# Patient Record
Sex: FEMALE | Race: AMERICAN INDIAN OR ALASKA NATIVE | URBAN - METROPOLITAN AREA
[De-identification: names, ages, dates, MRNs, and addresses within clinical notes are randomized per-mention and may not be internally consistent; named-entity substitution may affect disease eponyms.]

---

## 2022-07-06 ENCOUNTER — WEB ENCOUNTER (OUTPATIENT)
Dept: URBAN - METROPOLITAN AREA CLINIC 115 | Facility: CLINIC | Age: 77
End: 2022-07-06

## 2022-07-06 ENCOUNTER — LAB OUTSIDE AN ENCOUNTER (OUTPATIENT)
Dept: URBAN - METROPOLITAN AREA CLINIC 115 | Facility: CLINIC | Age: 77
End: 2022-07-06

## 2022-07-06 ENCOUNTER — OFFICE VISIT (OUTPATIENT)
Dept: URBAN - METROPOLITAN AREA CLINIC 115 | Facility: CLINIC | Age: 77
End: 2022-07-06
Payer: COMMERCIAL

## 2022-07-06 VITALS
TEMPERATURE: 97.8 F | HEIGHT: 62 IN | HEART RATE: 81 BPM | WEIGHT: 146 LBS | SYSTOLIC BLOOD PRESSURE: 126 MMHG | DIASTOLIC BLOOD PRESSURE: 75 MMHG | BODY MASS INDEX: 26.87 KG/M2

## 2022-07-06 DIAGNOSIS — K44.9 DIAPHRAGMATIC HERNIA WITHOUT OBSTRUCTION OR GANGRENE: ICD-10-CM

## 2022-07-06 DIAGNOSIS — K21.9 GASTROESOPHAGEAL REFLUX DISEASE, UNSPECIFIED WHETHER ESOPHAGITIS PRESENT: ICD-10-CM

## 2022-07-06 PROBLEM — 235595009: Status: ACTIVE | Noted: 2022-07-06

## 2022-07-06 PROCEDURE — 99203 OFFICE O/P NEW LOW 30 MIN: CPT | Performed by: INTERNAL MEDICINE

## 2022-07-06 RX ORDER — OMEPRAZOLE 20 MG/1
1 CAPSULE 30 MINUTES BEFORE MORNING MEAL CAPSULE, DELAYED RELEASE ORAL ONCE A DAY
Status: ACTIVE | COMMUNITY

## 2022-07-06 RX ORDER — LOSARTAN POTASSIUM 50 MG/1
1 TABLET TABLET ORAL ONCE A DAY
Status: ACTIVE | COMMUNITY

## 2022-07-06 NOTE — HPI-TODAY'S VISIT:
Nirav Garzon was seen today for evaluation of hernia.  Patient was having chronic cough and bronchitis and had an x-ray done that showed hiatal hernia and hence he was advised to see GI.  She also reports having intermittent dysphagia.  Denies obvious heartburn but reports having regurgitation.  She has had a history of colonoscopy 18 months ago with Dr. Rios's group.  Patient does not recall having any vomiting or nausea.  She currently on omeprazole 20 mg was prescribed by pulmonology.  Denies any chronic pain medications.

## 2022-07-07 ENCOUNTER — OFFICE VISIT (OUTPATIENT)
Dept: URBAN - METROPOLITAN AREA SURGERY CENTER 13 | Facility: SURGERY CENTER | Age: 77
End: 2022-07-07
Payer: COMMERCIAL

## 2022-07-07 ENCOUNTER — TELEPHONE ENCOUNTER (OUTPATIENT)
Dept: URBAN - METROPOLITAN AREA CLINIC 92 | Facility: CLINIC | Age: 77
End: 2022-07-07

## 2022-07-07 ENCOUNTER — CLAIMS CREATED FROM THE CLAIM WINDOW (OUTPATIENT)
Dept: URBAN - METROPOLITAN AREA CLINIC 4 | Facility: CLINIC | Age: 77
End: 2022-07-07
Payer: COMMERCIAL

## 2022-07-07 DIAGNOSIS — K21.9 GASTRO-ESOPHAGEAL REFLUX DISEASE WITHOUT ESOPHAGITIS: ICD-10-CM

## 2022-07-07 DIAGNOSIS — K31.89 ACQUIRED DEFORMITY OF DUODENUM: ICD-10-CM

## 2022-07-07 DIAGNOSIS — K29.70 GASTRITIS, UNSPECIFIED, WITHOUT BLEEDING: ICD-10-CM

## 2022-07-07 DIAGNOSIS — K21.9 ACID REFLUX: ICD-10-CM

## 2022-07-07 DIAGNOSIS — R93.3 ABN FINDINGS-GI TRACT: ICD-10-CM

## 2022-07-07 DIAGNOSIS — K22.2 ACQUIRED ESOPHAGEAL RING: ICD-10-CM

## 2022-07-07 PROCEDURE — 88312 SPECIAL STAINS GROUP 1: CPT | Performed by: PATHOLOGY

## 2022-07-07 PROCEDURE — 43239 EGD BIOPSY SINGLE/MULTIPLE: CPT | Performed by: INTERNAL MEDICINE

## 2022-07-07 PROCEDURE — 43249 ESOPH EGD DILATION <30 MM: CPT | Performed by: INTERNAL MEDICINE

## 2022-07-07 PROCEDURE — 88305 TISSUE EXAM BY PATHOLOGIST: CPT | Performed by: PATHOLOGY

## 2022-07-07 PROCEDURE — G8907 PT DOC NO EVENTS ON DISCHARG: HCPCS | Performed by: INTERNAL MEDICINE

## 2022-07-07 RX ORDER — OMEPRAZOLE 20 MG/1
1 CAPSULE 30 MINUTES BEFORE MORNING MEAL CAPSULE, DELAYED RELEASE ORAL ONCE A DAY
Status: ACTIVE | COMMUNITY

## 2022-07-07 RX ORDER — LOSARTAN POTASSIUM 50 MG/1
1 TABLET TABLET ORAL ONCE A DAY
Status: ACTIVE | COMMUNITY

## 2022-07-07 RX ORDER — OMEPRAZOLE 40 MG/1
1 CAPSULE 30 MINUTES BEFORE MORNING MEAL CAPSULE, DELAYED RELEASE ORAL ONCE A DAY
Qty: 90 CAPSULE | Refills: 1 | OUTPATIENT
Start: 2022-07-07

## 2022-07-19 ENCOUNTER — TELEPHONE ENCOUNTER (OUTPATIENT)
Dept: URBAN - METROPOLITAN AREA CLINIC 92 | Facility: CLINIC | Age: 77
End: 2022-07-19

## 2022-07-27 ENCOUNTER — OFFICE VISIT (OUTPATIENT)
Dept: URBAN - METROPOLITAN AREA CLINIC 115 | Facility: CLINIC | Age: 77
End: 2022-07-27
Payer: COMMERCIAL

## 2022-07-27 VITALS
SYSTOLIC BLOOD PRESSURE: 138 MMHG | HEIGHT: 62 IN | HEART RATE: 98 BPM | DIASTOLIC BLOOD PRESSURE: 83 MMHG | BODY MASS INDEX: 27.6 KG/M2 | TEMPERATURE: 97.8 F | WEIGHT: 150 LBS

## 2022-07-27 DIAGNOSIS — K29.60 REFLUX GASTRITIS: ICD-10-CM

## 2022-07-27 DIAGNOSIS — K22.89 PRESBYESOPHAGUS: ICD-10-CM

## 2022-07-27 DIAGNOSIS — K44.9 PARAESOPHAGEAL HIATAL HERNIA: ICD-10-CM

## 2022-07-27 PROCEDURE — 99214 OFFICE O/P EST MOD 30 MIN: CPT | Performed by: INTERNAL MEDICINE

## 2022-07-27 RX ORDER — FAMOTIDINE 40 MG/1
1 TABLET AT BEDTIME TABLET, FILM COATED ORAL ONCE A DAY
Qty: 90 TABLET | Refills: 4 | OUTPATIENT
Start: 2022-07-27

## 2022-07-27 RX ORDER — OMEPRAZOLE 40 MG/1
1 CAPSULE 30 MINUTES BEFORE MORNING MEAL CAPSULE, DELAYED RELEASE ORAL ONCE A DAY
Qty: 90 CAPSULE | Refills: 1 | Status: ACTIVE | COMMUNITY
Start: 2022-07-07

## 2022-07-27 RX ORDER — OMEPRAZOLE 40 MG/1
1 CAPSULE 30 MINUTES BEFORE MORNING MEAL CAPSULE, DELAYED RELEASE ORAL ONCE A DAY
Qty: 90 CAPSULE | Refills: 4
Start: 2022-07-07

## 2022-07-27 RX ORDER — LOSARTAN POTASSIUM 50 MG/1
1 TABLET TABLET ORAL ONCE A DAY
Status: ACTIVE | COMMUNITY

## 2022-07-27 RX ORDER — OMEPRAZOLE 20 MG/1
1 CAPSULE 30 MINUTES BEFORE MORNING MEAL CAPSULE, DELAYED RELEASE ORAL ONCE A DAY
Status: ACTIVE | COMMUNITY

## 2022-07-27 NOTE — HPI-TODAY'S VISIT:
Nirav Garzon was seen today for evaluation of hernia.  Patient was having chronic cough and bronchitis and had an x-ray done that showed hiatal hernia and hence he was advised to see GI.  She also reports having intermittent dysphagia.  Denies obvious heartburn but reports having regurgitation.  She has had a history of colonoscopy 18 months ago with Dr. Rios's group.  Patient does not recall having any vomiting or nausea.  She currently on omeprazole 20 mg was prescribed by pulmonology.  Denies any chronic pain medications. 7/27/22 : Ms. Garzon was seen today for the follow-up complaints of having abdominal bloating off and on but otherwise upper GI symptoms have remarkably improved.  Upper endoscopy revealed a paraesophageal hernia and presbyesophagus which was confirmed again on the barium swallow.  Patient reports having occasional reflux pantoprazole once a day in the morning is helping her symptoms.  She also had an EGD with dilatation and denies any dysphagia at this time.

## 2023-01-06 ENCOUNTER — OFFICE VISIT (OUTPATIENT)
Dept: URBAN - METROPOLITAN AREA CLINIC 115 | Facility: CLINIC | Age: 78
End: 2023-01-06
Payer: COMMERCIAL

## 2023-01-06 VITALS
SYSTOLIC BLOOD PRESSURE: 136 MMHG | HEIGHT: 62 IN | HEART RATE: 89 BPM | WEIGHT: 147 LBS | TEMPERATURE: 98.2 F | BODY MASS INDEX: 27.05 KG/M2 | DIASTOLIC BLOOD PRESSURE: 84 MMHG

## 2023-01-06 DIAGNOSIS — K29.60 REFLUX GASTRITIS: ICD-10-CM

## 2023-01-06 DIAGNOSIS — K22.89 PRESBYESOPHAGUS: ICD-10-CM

## 2023-01-06 DIAGNOSIS — K21.9 GASTRO-ESOPHAGEAL REFLUX DISEASE WITHOUT ESOPHAGITIS: ICD-10-CM

## 2023-01-06 DIAGNOSIS — K22.2 SCHATZKI'S RING: ICD-10-CM

## 2023-01-06 PROBLEM — 266435005: Status: ACTIVE | Noted: 2022-07-06

## 2023-01-06 PROBLEM — 72950008: Status: ACTIVE | Noted: 2022-07-27

## 2023-01-06 PROBLEM — 235634004: Status: ACTIVE | Noted: 2022-07-27

## 2023-01-06 PROBLEM — 371132002: Status: ACTIVE | Noted: 2022-07-06

## 2023-01-06 PROBLEM — 235623002: Status: ACTIVE | Noted: 2023-01-06

## 2023-01-06 PROBLEM — 21101000119105: Status: ACTIVE | Noted: 2023-01-06

## 2023-01-06 PROCEDURE — 99214 OFFICE O/P EST MOD 30 MIN: CPT | Performed by: INTERNAL MEDICINE

## 2023-01-06 RX ORDER — FAMOTIDINE 40 MG/1
1 TABLET AT BEDTIME TABLET, FILM COATED ORAL ONCE A DAY
Qty: 90 TABLET | Refills: 4 | Status: ACTIVE | COMMUNITY
Start: 2022-07-27

## 2023-01-06 RX ORDER — OMEPRAZOLE 40 MG/1
1 CAPSULE 30 MINUTES BEFORE MORNING MEAL CAPSULE, DELAYED RELEASE ORAL ONCE A DAY
Qty: 90 CAPSULE | Refills: 4
Start: 2022-07-07

## 2023-01-06 RX ORDER — OMEPRAZOLE 20 MG/1
1 CAPSULE 30 MINUTES BEFORE MORNING MEAL CAPSULE, DELAYED RELEASE ORAL ONCE A DAY
Status: ACTIVE | COMMUNITY

## 2023-01-06 RX ORDER — OMEPRAZOLE 40 MG/1
1 CAPSULE 30 MINUTES BEFORE MORNING MEAL CAPSULE, DELAYED RELEASE ORAL ONCE A DAY
Qty: 90 CAPSULE | Refills: 4 | Status: ACTIVE | COMMUNITY
Start: 2022-07-07

## 2023-01-06 RX ORDER — FAMOTIDINE 40 MG/1
1 TABLET AT BEDTIME TABLET, FILM COATED ORAL ONCE A DAY
Qty: 90 TABLET | Refills: 4
Start: 2022-07-27

## 2023-01-06 RX ORDER — LOSARTAN POTASSIUM 50 MG/1
1 TABLET TABLET ORAL ONCE A DAY
Status: ACTIVE | COMMUNITY

## 2023-01-06 NOTE — HPI-TODAY'S VISIT:
Nirav Garzon was seen today for evaluation of hernia.  Patient was having chronic cough and bronchitis and had an x-ray done that showed hiatal hernia and hence he was advised to see GI.  She also reports having intermittent dysphagia.  Denies obvious heartburn but reports having regurgitation.  She has had a history of colonoscopy 18 months ago with Dr. Riso's group.  Patient does not recall having any vomiting or nausea.  She currently on omeprazole 20 mg was prescribed by pulmonology.  Denies any chronic pain medications. 7/27/22 : Ms. Garzon was seen today for the follow-up complaints of having abdominal bloating off and on but otherwise upper GI symptoms have remarkably improved.  Upper endoscopy revealed a paraesophageal hernia and presbyesophagus which was confirmed again on the barium swallow.  Patient reports having occasional reflux pantoprazole once a day in the morning is helping her symptoms.  She also had an EGD with dilatation and denies any dysphagia at this time.  1/6/23: Ms. Gastelum was seen in the office for follow-up.  Since she was last seen reports acid reflux happens 3-4 times a week and she takes a second dose of omeprazole intermittently.  She takes Pepcid at bedtime.  Barium showed swallow showed a large hiatal hernia without any obstruction but she had a Schatzki's ring that was dilated since then her reflux symptoms have been stable and also her dysphagia has been also very infrequent.  Patient admits for having dysphagia more when she eats faster.  EGD findings have been discussed with the patient.

## 2024-01-26 ENCOUNTER — OFFICE VISIT (OUTPATIENT)
Dept: URBAN - METROPOLITAN AREA CLINIC 115 | Facility: CLINIC | Age: 79
End: 2024-01-26

## 2024-01-28 ENCOUNTER — ERX REFILL RESPONSE (OUTPATIENT)
Dept: URBAN - METROPOLITAN AREA CLINIC 82 | Facility: CLINIC | Age: 79
End: 2024-01-28

## 2024-01-28 RX ORDER — OMEPRAZOLE 40 MG/1
TAKE 1 CAPSULE BY MOUTH EVERY DAY 30 MINUTES BEFORE BREAKFAST CAPSULE, DELAYED RELEASE ORAL
Qty: 90 CAPSULE | Refills: 0 | OUTPATIENT

## 2024-01-28 RX ORDER — OMEPRAZOLE 40 MG/1
1 CAPSULE 30 MINUTES BEFORE MORNING MEAL CAPSULE, DELAYED RELEASE ORAL ONCE A DAY
Qty: 90 CAPSULE | Refills: 4 | OUTPATIENT

## 2024-02-16 ENCOUNTER — OV EP (OUTPATIENT)
Dept: URBAN - METROPOLITAN AREA CLINIC 115 | Facility: CLINIC | Age: 79
End: 2024-02-16
Payer: COMMERCIAL

## 2024-02-16 VITALS
DIASTOLIC BLOOD PRESSURE: 79 MMHG | SYSTOLIC BLOOD PRESSURE: 130 MMHG | HEIGHT: 62 IN | TEMPERATURE: 97.9 F | HEART RATE: 91 BPM | BODY MASS INDEX: 27.23 KG/M2 | WEIGHT: 148 LBS

## 2024-02-16 DIAGNOSIS — K21.9 GASTRO-ESOPHAGEAL REFLUX DISEASE WITHOUT ESOPHAGITIS: ICD-10-CM

## 2024-02-16 DIAGNOSIS — K22.89 PRESBYESOPHAGUS: ICD-10-CM

## 2024-02-16 DIAGNOSIS — K44.9 PARAESOPHAGEAL HIATAL HERNIA: ICD-10-CM

## 2024-02-16 PROCEDURE — 99214 OFFICE O/P EST MOD 30 MIN: CPT | Performed by: INTERNAL MEDICINE

## 2024-02-16 RX ORDER — OMEPRAZOLE 40 MG/1
TAKE 1 CAPSULE BY MOUTH EVERY DAY 30 MINUTES BEFORE BREAKFAST CAPSULE, DELAYED RELEASE ORAL ONCE A DAY
Refills: 0 | Status: DISCONTINUED | COMMUNITY

## 2024-02-16 RX ORDER — LOSARTAN POTASSIUM 50 MG/1
1 TABLET TABLET ORAL ONCE A DAY
Status: ACTIVE | COMMUNITY

## 2024-02-16 RX ORDER — OMEPRAZOLE 20 MG/1
1 CAPSULE 30 MINUTES BEFORE MORNING MEAL CAPSULE, DELAYED RELEASE ORAL ONCE A DAY
Status: ACTIVE | COMMUNITY

## 2024-02-16 RX ORDER — FAMOTIDINE 40 MG/1
1 TABLET AT BEDTIME TABLET, FILM COATED ORAL ONCE A DAY
Qty: 90 TABLET | Refills: 4 | Status: ACTIVE | COMMUNITY
Start: 2022-07-27

## 2024-02-16 RX ORDER — OMEPRAZOLE 40 MG/1
TAKE 1 CAPSULE BY MOUTH EVERY DAY 30 MINUTES BEFORE BREAKFAST CAPSULE, DELAYED RELEASE ORAL
Qty: 90 CAPSULE | Refills: 0 | OUTPATIENT

## 2024-02-16 NOTE — HPI-TODAY'S VISIT:
Nirav Garzon was seen today for evaluation of hernia.  Patient was having chronic cough and bronchitis and had an x-ray done that showed hiatal hernia and hence he was advised to see GI.  She also reports having intermittent dysphagia.  Denies obvious heartburn but reports having regurgitation.  She has had a history of colonoscopy 18 months ago with Dr. Rios's group.  Patient does not recall having any vomiting or nausea.  She currently on omeprazole 20 mg was prescribed by pulmonology.  Denies any chronic pain medications. 7/27/22 : Ms. Garzon was seen today for the follow-up complaints of having abdominal bloating off and on but otherwise upper GI symptoms have remarkably improved.  Upper endoscopy revealed a paraesophageal hernia and presbyesophagus which was confirmed again on the barium swallow.  Patient reports having occasional reflux pantoprazole once a day in the morning is helping her symptoms.  She also had an EGD with dilatation and denies any dysphagia at this time.  1/6/23: Ms. Gastelum was seen in the office for follow-up.  Since she was last seen reports acid reflux happens 3-4 times a week and she takes a second dose of omeprazole intermittently.  She takes Pepcid at bedtime.  Barium showed swallow showed a large hiatal hernia without any obstruction but she had a Schatzki's ring that was dilated since then her reflux symptoms have been stable and also her dysphagia has been also very infrequent.  Patient admits for having dysphagia more when she eats faster.  EGD findings have been discussed with the patient.  2/16/24 : Ms. Garzon came in today along with her daughter for the evaluation of having her reflux and retrosternal chest fullness and discomfort.  Patient stated she is having frequent heaviness after eating food and also sometimes palpitations after eating food.  She has diagnoses of moderate to large paraesophageal hernia.  In the past we have discussed about surgical options and conservative management today she would like to see a surgeon because of her ongoing worsening of her symptoms.  Denies any unintentional weight loss.  Her prior procedures and imaging studies were all reviewed.